# Patient Record
Sex: MALE | Race: WHITE | NOT HISPANIC OR LATINO | Employment: FULL TIME | URBAN - NONMETROPOLITAN AREA
[De-identification: names, ages, dates, MRNs, and addresses within clinical notes are randomized per-mention and may not be internally consistent; named-entity substitution may affect disease eponyms.]

---

## 2019-05-01 ENCOUNTER — NON-PROVIDER VISIT (OUTPATIENT)
Dept: URGENT CARE | Facility: PHYSICIAN GROUP | Age: 29
End: 2019-05-01

## 2019-05-01 DIAGNOSIS — Z02.1 PRE-EMPLOYMENT DRUG SCREENING: ICD-10-CM

## 2020-01-31 ENCOUNTER — NON-PROVIDER VISIT (OUTPATIENT)
Dept: URGENT CARE | Facility: PHYSICIAN GROUP | Age: 30
End: 2020-01-31

## 2020-01-31 DIAGNOSIS — Z02.1 PRE-EMPLOYMENT DRUG SCREENING: ICD-10-CM

## 2020-01-31 LAB
AMP AMPHETAMINE: NORMAL
COC COCAINE: NORMAL
INT CON NEG: NORMAL
INT CON POS: NORMAL
MET METHAMPHETAMINES: NORMAL
OPI OPIATES: NORMAL
PCP PHENCYCLIDINE: NORMAL
POC DRUG COMMENT 753798-OCCUPATIONAL HEALTH: NEGATIVE
THC: NORMAL

## 2020-01-31 PROCEDURE — 80305 DRUG TEST PRSMV DIR OPT OBS: CPT | Performed by: FAMILY MEDICINE

## 2020-02-07 ENCOUNTER — NON-PROVIDER VISIT (OUTPATIENT)
Dept: URGENT CARE | Facility: PHYSICIAN GROUP | Age: 30
End: 2020-02-07

## 2020-02-07 ENCOUNTER — OCCUPATIONAL MEDICINE (OUTPATIENT)
Dept: URGENT CARE | Facility: PHYSICIAN GROUP | Age: 30
End: 2020-02-07
Payer: OTHER MISCELLANEOUS

## 2020-02-07 VITALS
SYSTOLIC BLOOD PRESSURE: 122 MMHG | WEIGHT: 199 LBS | BODY MASS INDEX: 24.23 KG/M2 | DIASTOLIC BLOOD PRESSURE: 76 MMHG | OXYGEN SATURATION: 98 % | TEMPERATURE: 99 F | HEART RATE: 79 BPM | HEIGHT: 76 IN | RESPIRATION RATE: 18 BRPM

## 2020-02-07 DIAGNOSIS — Z02.83 ENCOUNTER FOR DRUG SCREENING: ICD-10-CM

## 2020-02-07 DIAGNOSIS — H16.133 PHOTOKERATITIS OF BOTH EYES: ICD-10-CM

## 2020-02-07 LAB
AMP AMPHETAMINE: NORMAL
COC COCAINE: NORMAL
INT CON NEG: NORMAL
INT CON POS: NORMAL
MET METHAMPHETAMINES: NORMAL
OPI OPIATES: NORMAL
PCP PHENCYCLIDINE: NORMAL
POC DRUG COMMENT 753798-OCCUPATIONAL HEALTH: NORMAL
THC: NORMAL

## 2020-02-07 PROCEDURE — 80305 DRUG TEST PRSMV DIR OPT OBS: CPT | Performed by: PHYSICIAN ASSISTANT

## 2020-02-07 PROCEDURE — 99203 OFFICE O/P NEW LOW 30 MIN: CPT | Performed by: PHYSICIAN ASSISTANT

## 2020-02-07 ASSESSMENT — ENCOUNTER SYMPTOMS: EYE PAIN: 1

## 2020-02-07 NOTE — LETTER
[facility] [address] [place] [address]  Phone:  [phone] - Fax:  [phone]   [facility] Progress Report and Disability Certification  Date of Service: 2/7/2020   No Show:  No  Date / Time of Next Visit: 2/10/2020   Claim Information   Patient Name: Usman Rock  Claim Number:     Employer: [employer]  Date of Injury: 2/4/2020     Insurer / TPA: [employer] - Tpa  ID / SSN:     Occupation:   Diagnosis: The encounter diagnosis was Photokeratitis of both eyes.    Medical Information   Related to Industrial Injury? Yes    Subjective Complaints:  DOI: 02/04/2020   . No secondary employment.     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  Later on that night, he started to experience severe pain to bilateral eyes.  He treated himself with Advil with some relief.  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 Described as gritty sensation. Associated eyes were difficulty to open, and redness. No drainage. No foreign body sensation. Does not believe a foreign body is in his eye.   Currently, pain is resolved. He is experiencing intermittent black floating dots in vision, started this morning. No current blurry vision or vision loss. No fever or chills. No headache   He has optometrist appointment in [place] on 02/010/20.        Objective Findings: Eyes: PERRL. Conjunctiva normal.  No drainage.  Eyelids normal with no redness or swelling.  Extraocular muscles intact.  Vision grossly intact.  No tenderness to palpation.   Visual Acuity:   OD: 20/40 OS: 20/40  Both 20/25      Pre-Existing Condition(s):     Assessment:   Initial Visit    Status: Additional Care Required  Permanent Disability:No    Plan:      Diagnostics:      Comments:  Continue to wear sunglasses   Ibuprofen and Tylenol for any pain.   Keep eyes clean.  Flush with water as needed.   Follow up with optometrist.   Follow up here in the clinic on 02/10/20. Return sooner if any vision loss, worsening eye pain, redness,   or drainage.       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   No eye exposure to bright light.   No working outside in the sun.     Repetitive Actions   Hands: i.e. Fine Manipulations from Grasping:     Feet: i.e. Operating Foot Controls:     Driving / Operate Machinery:     Physician Name: Diamante Taylor P.A.-C. Physician Signature: DIAMANTE Robledo P.A.-C. e-Signature: Dr. Andrea Luis, Medical Director   Clinic Name / Location: [address] [address] Clinic Phone Number: Dept: [phone]   Appointment Time: 3:25 Pm Visit Start Time: 3:55 PM   Check-In Time:  3:35 Pm Visit Discharge Time:  5:05 PM    Original-Treating Physician or Chiropractor    Page 2-Insurer/TPA    Page 3-Employer    Page 4-Employee
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
## 2020-02-10 ENCOUNTER — OCCUPATIONAL MEDICINE (OUTPATIENT)
Dept: URGENT CARE | Facility: PHYSICIAN GROUP | Age: 30
End: 2020-02-10
Payer: OTHER MISCELLANEOUS

## 2020-02-10 VITALS
WEIGHT: 197 LBS | TEMPERATURE: 98.8 F | BODY MASS INDEX: 23.99 KG/M2 | DIASTOLIC BLOOD PRESSURE: 74 MMHG | RESPIRATION RATE: 16 BRPM | SYSTOLIC BLOOD PRESSURE: 108 MMHG | OXYGEN SATURATION: 97 % | HEART RATE: 114 BPM | HEIGHT: 76 IN

## 2020-02-10 DIAGNOSIS — H16.133 PHOTOKERATITIS OF BOTH EYES: ICD-10-CM

## 2020-02-10 PROCEDURE — 99213 OFFICE O/P EST LOW 20 MIN: CPT | Performed by: PHYSICIAN ASSISTANT

## 2020-02-10 ASSESSMENT — ENCOUNTER SYMPTOMS
HEADACHES: 0
EYE DISCHARGE: 0
BLURRED VISION: 0
EYE REDNESS: 1
CHILLS: 0
DOUBLE VISION: 0
PHOTOPHOBIA: 1
FEVER: 0
EYE PAIN: 0

## 2020-02-10 NOTE — LETTER
[address] [place] [address]  Phone:  [phone] - Fax:  [phone]   [facility] Progress Report and Disability Certification  Date of Service: 2/10/2020   No Show:  No  Date / Time of Next Visit: 2/12/2020   Claim Information   Patient Name: Usman Rock  Claim Number:     Employer: [employer]  Date of Injury: 2/4/2020     Insurer / TPA: [employer] - Tpa  ID / SSN:     Occupation:   Diagnosis: The encounter diagnosis was Photokeratitis of both eyes.    Medical Information   Related to Industrial Injury? Yes    Subjective Complaints:  Date of injury 2/7/2020.  Patient evaluated for photokeratitis and placed on work restrictions.  He was to follow-up with his optometrist today.  However he was told that his optometrist would not accept his insurance.  Patient symptoms are much improved, however he still is experiencing some sensitivity to light.  Floaters resolved.  No eye pain, mild redness.  Patient also has peeling skin on his face and left side of his neck.  Wounds on lips.  He is taking Motrin as needed for symptoms   Objective Findings:  PERRLA, EOMI, bilaterally.  No pain with extraocular movements.  Mild bilateral conjunctival injection.  No discharge.  Anterior chambers are clear and nonbulging bilaterally.  No evidence of corneal abrasion or foreign body on gross examination.    Pre-Existing Condition(s):     Assessment:   Condition Improved    Status: Additional Care Required  Permanent Disability:No    Plan:      Diagnostics:      Comments:  Maintain work restrictions.  [facility] with [facility].    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   No eye exposure to bright light.   No working outside in the sun.      Repetitive Actions   Hands: i.e. Fine Manipulations from Grasping:     Feet: i.e. Operating Foot Controls:     Driving / Operate Machinery:     Physician Name: Lanny Ashby P.A.-C. Physician Signature: LANNY Gurrola P.A.-C. e-Signature: Dr. Andrea Luis, Medical Director   Clinic Name / Location: [address] [address] Clinic Phone Number: Dept: [phone]   Appointment Time: 6:00 Pm Visit Start Time: 6:03 PM   Check-In Time:  5:50 Pm Visit Discharge Time:  6:30 PM    Original-Treating Physician or Chiropractor    Page 2-Insurer/TPA    Page 3-Employer    Page 4-Employee

## 2020-02-11 NOTE — PROGRESS NOTES
"Subjective:   Usman Rock is a 29 y.o. male who presents for Eye Problem (Photokeratitis of both eyes [facility])        Date of injury 2/7/2020.  Patient evaluated for photokeratitis and placed on work restrictions.  He was to follow-up with his optometrist today.  However he was told that his optometrist would not accept his insurance.  Patient symptoms are much improved, however he still is experiencing some sensitivity to light.  Floaters resolved.  No eye pain, mild redness.  Patient also has peeling skin on his face and left side of his neck.  Wounds on lips.  He is taking Motrin as needed for symptoms.    Review of Systems   Constitutional: Negative for chills and fever.   Eyes: Positive for photophobia and redness. Negative for blurred vision, double vision, pain and discharge.   Neurological: Negative for headaches.       PMH: No pertinent past medical history to this problem  MEDS: Medications were reviewed in Epic  ALLERGIES: Allergies were reviewed in Epic  [employer]CH[employer]: Works as a   FH: No pertinent family history to this problem     Objective:   /74   Pulse (!) 114   Temp 37.1 °C (98.8 °F) (Temporal)   Resp 16   Ht 1.93 m (6' 4\")   Wt 89.4 kg (197 lb)   SpO2 97%   BMI 23.98 kg/m²   Physical Exam  Vitals signs reviewed.   Constitutional:       General: He is not in acute distress.     Appearance: Normal appearance. He is well-developed. He is not toxic-appearing.   HENT:      Head: Normocephalic and atraumatic.      Right Ear: External ear normal.      Left Ear: External ear normal.      Nose: Nose normal.   Eyes:      General: Lids are normal.      Conjunctiva/sclera: Conjunctivae normal.      Comments: PERRLA, EOMI, bilaterally.  No pain with extraocular movements.  Mild bilateral conjunctival injection.  No discharge.  Anterior chambers are clear and nonbulging bilaterally.  No evidence of corneal abrasion or foreign body on gross examination.   Neck:      Musculoskeletal: Neck " supple.   Cardiovascular:      Rate and Rhythm: Regular rhythm. Tachycardia present.   Pulmonary:      Effort: Pulmonary effort is normal. No respiratory distress.      Breath sounds: Normal breath sounds.   Musculoskeletal:      Comments: Normal range of motion. Exhibits no edema and no tenderness.    Skin:     General: Skin is warm and dry.      Capillary Refill: Capillary refill takes less than 2 seconds.   Neurological:      Mental Status: He is alert and oriented to person, place, and time.      Cranial Nerves: No cranial nerve deficit.      Sensory: No sensory deficit.   Psychiatric:         Speech: Speech normal.         Behavior: Behavior normal.         Thought Content: Thought content normal.         Judgment: Judgment normal.           Assessment/Plan:   1. Photokeratitis of both eyes    I would like patient to have an optometry evaluation prior to returning to full duty.  Transfer of care to [facility] for complete eye exam.    Differential diagnosis, natural history, supportive care, and indications for immediate follow-up discussed.

## 2020-02-12 ENCOUNTER — OCCUPATIONAL MEDICINE (OUTPATIENT)
Dept: URGENT CARE | Facility: PHYSICIAN GROUP | Age: 30
End: 2020-02-12
Payer: OTHER MISCELLANEOUS

## 2020-02-12 VITALS
TEMPERATURE: 98.7 F | WEIGHT: 198 LBS | BODY MASS INDEX: 24.11 KG/M2 | HEIGHT: 76 IN | RESPIRATION RATE: 16 BRPM | HEART RATE: 86 BPM | SYSTOLIC BLOOD PRESSURE: 110 MMHG | DIASTOLIC BLOOD PRESSURE: 76 MMHG | OXYGEN SATURATION: 98 %

## 2020-02-12 DIAGNOSIS — H16.133 PHOTOKERATITIS OF BOTH EYES: ICD-10-CM

## 2020-02-12 PROCEDURE — 99213 OFFICE O/P EST LOW 20 MIN: CPT | Performed by: PHYSICIAN ASSISTANT

## 2020-02-12 ASSESSMENT — ENCOUNTER SYMPTOMS: EYE BURN: 1

## 2020-02-12 NOTE — LETTER
[address] [place] [address]  Phone:  [phone] - Fax:  [phone]   [facility] Progress Report and Disability Certification  Date of Service: 2/12/2020   No Show:  No  Date / Time of Next Visit:     Claim Information   Patient Name: Usman Rock  Claim Number:     Employer: [employer]  Date of Injury: 2/4/2020     Insurer / TPA: [employer] - Tpa  ID / SSN:     Occupation: [employer]  Diagnosis: The encounter diagnosis was Photokeratitis of both eyes.    Medical Information   Related to Industrial Injury?      Subjective Complaints:  DOI: 2/4/2020.  Pt here for follow up of photo keratitis that occurred while at work.  Pt states vision and light sensitivity have improved significantly, back to baseline. No pain in eyes.   Pt has been seen by [facility] and was told his eyes are healed and can return to work.      Objective Findings: Eye exam: PERRLA, EOMI, no tearing, erythema or discharge.     Pre-Existing Condition(s):     Assessment:   Condition Improved    Status: Discharged /  MMI  Permanent Disability:No    Plan:      Diagnostics:      Comments:       Disability Information   Status:      From:     Through:   Restrictions are:     Physical Restrictions   Sitting:    Standing:    Stooping:    Bending:      Squatting:    Walking:    Climbing:    Pushing:      Pulling:    Other:    Reaching Above Shoulder (L):   Reaching Above Shoulder (R):       Reaching Below Shoulder (L):    Reaching Below Shoulder (R):      Not to exceed Weight Limits   Carrying(hrs):   Weight Limit(lb):   Lifting(hrs):   Weight  Limit(lb):     Comments: PT discharged MMI. PT will  paperwork from [facility] and take that paperwork to his HR/work comp to add to this form.      Repetitive Actions   Hands: i.e. Fine Manipulations from Grasping:     Feet: i.e. Operating Foot Controls:     Driving / Operate Machinery:      Physician Name: Corinne Ding P.A.-C. Physician Signature:   e-Signature: Dr. Andrea Luis, Medical Director   Clinic Name / Location: [address] [address] Clinic Phone Number: Dept: [phone]   Appointment Time: 6:00 Pm Visit Start Time: 6:42 PM   Check-In Time:  5:51 Pm Visit Discharge Time:  7:00 PM    Original-Treating Physician or Chiropractor    Page 2-Insurer/TPA    Page 3-Employer    Page 4-Employee

## 2020-02-13 ASSESSMENT — ENCOUNTER SYMPTOMS
EYE PAIN: 0
EYE DISCHARGE: 0
BLURRED VISION: 0
PHOTOPHOBIA: 0
EYE REDNESS: 0
DOUBLE VISION: 0

## 2020-02-13 NOTE — PROGRESS NOTES
"Subjective:      Usman Rock is a 29 y.o. male who presents with Follow-Up and Eye Burn    Pt PMH, SocHx, SurgHx, FamHx, Drug allergies and medications reviewed with pt/EPIC.      Family history reviewed, it is not pertinent to this complaint.           Work comp follow up, problem resolved.     Eye Burn    Pertinent negatives include no blurred vision, eye discharge, double vision, eye redness or photophobia.       Review of Systems   Eyes: Negative for blurred vision, double vision, photophobia, pain, discharge and redness.   All other systems reviewed and are negative.         Objective:     /76   Pulse 86   Temp 37.1 °C (98.7 °F) (Temporal)   Resp 16   Ht 1.93 m (6' 4\")   Wt 89.8 kg (198 lb)   SpO2 98%   BMI 24.10 kg/m²      Physical Exam  Eyes:      General: Lids are normal. Gaze aligned appropriately. No visual field deficit.        Right eye: No discharge.         Left eye: No discharge.      Extraocular Movements: Extraocular movements intact.      Conjunctiva/sclera: Conjunctivae normal.            Eye exam: PERRLA, EOMI, no tearing, erythema or discharge.       Assessment/Plan:     1. Photokeratitis of both eyes       PT has reached MMI and has been discharged to return to work without restrictions.       "

## 2020-10-02 ENCOUNTER — NON-PROVIDER VISIT (OUTPATIENT)
Dept: URGENT CARE | Facility: PHYSICIAN GROUP | Age: 30
End: 2020-10-02

## 2020-10-02 DIAGNOSIS — Z02.1 PRE-EMPLOYMENT DRUG SCREENING: ICD-10-CM

## 2020-10-02 PROCEDURE — 80305 DRUG TEST PRSMV DIR OPT OBS: CPT | Performed by: FAMILY MEDICINE

## 2020-11-10 ENCOUNTER — NON-PROVIDER VISIT (OUTPATIENT)
Dept: URGENT CARE | Facility: PHYSICIAN GROUP | Age: 30
End: 2020-11-10

## 2020-11-10 DIAGNOSIS — Z02.83 ENCOUNTER FOR DRUG SCREENING: ICD-10-CM

## 2020-11-10 PROCEDURE — 80305 DRUG TEST PRSMV DIR OPT OBS: CPT | Performed by: PHYSICIAN ASSISTANT
